# Patient Record
(demographics unavailable — no encounter records)

---
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
## 2024-11-06 NOTE — ASSESSMENT
[FreeTextEntry1] : . #Chest pain: Chest pain radiating to the right arm associated with dyspnea along with EKG changes is concerning for an MI.  I am sending the patient to the emergency room via ambulance to rule out myocardial infarction.

## 2024-11-06 NOTE — PHYSICAL EXAM
[Normal] : normal rate, regular rhythm, normal S1 and S2 and no murmur heard [TextEntry] : EKG shows normal sinus rhythm with a new inverted T wave in lead V1 and poor R wave progression to V2 which is new compared to previous EKGs.  There is also an interpolated VPC.

## 2024-11-11 NOTE — ASSESSMENT
[FreeTextEntry1] : 11.8.2022 marketing for optimum single straight male patient previously seen by Dr Abel Peña maintained on Testopel  6 pellets x 4 year treatment was for documented low T low mental acuity and fatigue when on T improved   had attempted xyosted had attempted 3 injection and then developed ecchymosis   discussed  We had an extensive discussion re Risks of T replacement; Patient was informed about Black box warning from FDA. We discussed recent data regarding increased risk of coronary plaque size, heart disease; thrombolic event; increased Hbg/Hct, breast tenderness; acne; irritability; sleep apnea and increased urinary symptoms; effect on testicular function including suppression of spermatogenesis; hair loss (male pattern baldness) effect on LFTS; effect on prostate cancer. Different treatment approaches were discussed including IM, transdermal and Testopel and AVEED We discussed advantages and disadvantages of each  PSA 6.03 on finasteride x 5 years; one year ago reported as 3 discussed need for MRI + family history - father prostate cancer  we discussed biopsy in light of strong family history and PSA elevation even in the absence of MRI findings discussed MRI for identification of areas of concern We discussed the risks and complications of prostate biopsy and discussed the procedure. We discussed that procedure is performed by placing a rectal probe and administering anesthesia locally. Biopsies are then taken with a needle. Multiple biopsies are taken,  Risks of the procedure include hematuria, hematospermia, blood per rectum. Risks include infection, sepsis and even death. We discussed perineal vs transrectal biopsy We discussed decreased risk of perineal biopsy vs transrectal but associated greater discomfort  We discussed options of: 1. local, 2. IV sedation in Operating room 3. po Valium  Plan: 1. repeat PSA 2. MRI arranged by Dr Mendez 3. obtain outside labs verifying low T (patient will forward) 4. DELAY T treatment pending evaluation of PSA  2.22.23 returns to proceed w/ TRT PSA 4.54 in November 2022 prostate MRI: 1/25/23--PIRADS 2, 99c prostate PSAD 0.045 discussed family history of prostate cancer paternal uncle and father s/p biopsy x 2 10 years ago  long discussion regarding PSA and family risk recommend repeat  bopsy patient wants AVEED since believe effect of xyosted is less effective discussed mean of delivery  Plan: patient agrees to : testosterone PSA urine culture We discussed the risks and complications of prostate biopsy and discussed the procedure. We discussed that procedure is performed by placing a rectal probe and administering anesthesia locally. Biopsies are then taken with a needle. Multiple biopsies are taken,  Risks of the procedure include hematuria, hematospermia, blood per rectum. Risks include infection, sepsis and even death. We discussed perineal vs transrectal biopsy We discussed decreased risk of perineal biopsy vs transrectal but associated greater discomfort  We discussed options of: 1. local, 2. IV sedation in Operating room 3. po Valium Fleets enema should be taken. ASA and NSAID must be stopped 1 week prior to procedure.  5.2.2023 PSA 4.54 in November 2022 prostate MRI: 1/25/23--PIRADS 2, 99c prostate PSAD 0.045 discussed family history of prostate cancer paternal uncle and father s/p biopsy x 2 10 years ago  REPEAT BIOPSY benign  patient states that he continues to xyosted  patient does not want to continue xyosted want to transition to aveed reviewed risks of AVEED with special emphasis on POME discussed  treatment protocol (initiation and maintenance) Informational materials given to patient.  6.20.2023  We had an extensive discussion re Risks of T replacement; Patient was informed about Black box warning from FDA. We discussed recent data regarding increased risk of coronary plaque size, heart disease; thrombolic event; increased Hbg/Hct, breast tenderness; acne; irritability; sleep apnea and increased urinary symptoms; effect on testicular function including suppression of spermatogenesis; hair loss (male pattern baldness) effect on LFTS; effect on prostate cancer. Different treatment approaches were discussed including IM, transdermal and Testopel We discussed advantages and disadvantages of each We discussed risk to T exposure to partners and children from transdermal appoaches. Patient choses to proceed with AVEED   9.28.2023 left flank pain ? musculoskeletal no radiation or change in urrinary symptoms findings suggestive of musculoskeletal origin consistent with prior diagnosis  patient anxious regarding symptoms  PLan: 1. urinalysis  2. abdominal ultrasound  3. NSAID/hot baths prn [facility]  expressed fully understanding of the information provided, the consequences and the management.  2/2/2024  Mr RANDALL returns.  He is complaining of similar urinary symptoms as in the past.  He states his urine is malodorous.  His urine frequency and nocturia have improved on alfuzosin.   We discussed his PSA.  We discussed his previous PSA values and his previous MRI and prostate biopsy.  Plan: 1.  PSA 4K 2.  We discussed the significance of a 4K assessment. 3.  Urinalysis urine culture 4.  Follow-up in 6 weeks for flow and PVR  3.21.2024 returns on daily tadalfil not sexually active LUTS 13 nocturia x 2 flow non diagnostic PVR 33 on uroxatral   will hold tadalfil since not sexually   PSA  5.0 4k low riske  Plan: continue AVEED Hbg/Hct 2/2024 normal stop daily tadalafil continue alfuzosin fu 3  weeks for AVEED    6.18.2024 complaining of nocturia x 4 on alfuzosin reviewed prior PVR nondiagnostic flow discussed need for flow to assess ? obstruction or voiding dysfunction patient queried about [place]? discussed potential for minimally invasive treatment patient not interested in intervention Plan: follow-up  flow and pvr

## 2024-11-11 NOTE — HISTORY OF PRESENT ILLNESS
[FreeTextEntry1] : 60 year old male referred by Dr. Mendez low testosterone for several years tried several replacement options prefers testopel but d/c'd use due to lack of insurance coverage currently uses injections   IPSS 17 LIV 15  11.8.2022 marketing for optimum single straight male patient previously seen by Dr Abel Peña maintained on Testopel  6 pellets x 4 year treatment was for documented low T low mental acuity and fatigue when on T improved    4.20.2023 s/p perineal biopsy complaining of urinary difficulty low grade temp last evening  4.26.2023 s/p perineal biopsy  5.2.2023 s/p biopsy   6.20.2023 returns re hypogonadism s/p biopsy for elevated PSA  7.20.2023 returns complaining of incomplete voiding  9.28.2023 patient presents complaining of left fank pain no fevers no chills no precitipating factors no radiation no dysuria or hematura  2.2.2024 complaining of urgency no dysuria no hematuria ?  malodorous urine on aluzosin IPSS 17 improved nocturia (1/2 what had been) nsns 3.21.2024 returns re LUTS returns re Hypogonadism returns re PSA seen in ER for chest pain and palipatios...EKG demonstrated ?abormality underwent angio  findngs reviewed - normal study   6.18.2024 returns re hypogonadism and LUTS maintained on aveed complainiing of nocturia and frequency nocturia x 4

## 2024-12-16 NOTE — HISTORY OF PRESENT ILLNESS
[FreeTextEntry8] : Beginning Saturday patient had the onset of periumbilical pain.  He had been constipated and over the weekend the pain got worse.  Last night he heard a lot of bowel sounds coming from his abdomen which caused him concern.  He had a very small bowel movement this morning despite using MiraLAX last night.  He tried an enema but just liquids came out.  He has had no fever but felt chilled.  There was no rigors.  He also tried bisacodyl suppository. On December 5 he underwent a cardiac catheterization with coronary artery spasm was noted.  His antihypertensive medications were discontinued and amlodipine 5 mg was started in its place. Last colonoscopy was in April according to the patient.

## 2024-12-16 NOTE — PHYSICAL EXAM
[Soft] : abdomen soft [Non Tender] : non-tender [Non-distended] : non-distended [de-identified] : There is no rebound in any quadrant.  There is mild to moderate tenderness in the left upper quadrant.

## 2024-12-16 NOTE — ASSESSMENT
[FreeTextEntry1] : . #Acute abd pain: Most likely related to constipation. Diverticulitis is less likely. Advise: Start Senna in addition to MiraLAX and Dulcolax. Try fleets enema as well. Patient is instructed to go to the ER if symptoms worsen. Patient feels that the amlodipine has caused the constipation but I don't think so. I asked him to discuss with his cardiologist.  #Hypertension: Blood pressure is elevated today but patient is in some discomfort.  Will continue amlodipine 5 mg for now pending input from the cardiologist.

## 2025-01-15 NOTE — HISTORY OF PRESENT ILLNESS
[FreeTextEntry8] : Patient is here because of abdominal discomfort and diarrhea.  Patient states he has a history of C. difficile colitis.  2 weeks ago he developed influenza and a sinus infection.  He took Tamiflu and a cephalosporin for sinus infection.  For the last few days he has had abdominal discomfort and a couple of loose bowel movements.  He has no fever or severe abdominal pain at this time he has had no hematochezia.

## 2025-01-15 NOTE — PHYSICAL EXAM
[Clear to Auscultation] : lungs were clear to auscultation bilaterally [Normal] : no respiratory distress, lungs were clear to auscultation bilaterally and no accessory muscle use [Soft] : abdomen soft [de-identified] : Mild left lower quadrant tenderness [TextEntry] : Repeat blood pressure manually is 150/90

## 2025-01-15 NOTE — ASSESSMENT
[FreeTextEntry1] : . #Abdominal pain and loose stool since taking an antibiotic: Must rule out C. difficile.  Will obtain stool for C. difficile  #Hypertension not controlled: Will reintroduce losartan 25 mg daily to patient's regimen and see him again in 2 weeks.

## 2025-01-18 NOTE — PHYSICAL EXAM
[TextEntry] : Gen: NAD Eyes: normal externally, no discharge, EOMI HENT: normal external ears, nose, mouth, MMM Neck: moving normally without stiffness Pulm: breathing normally without distress Skin: no obvious rashes Neuro: A&Ox3, no focal deficits Psych: Normal affect, cooperative

## 2025-01-18 NOTE — PLAN
[FreeTextEntry1] : 1. take fidaxomycin (Dificid) Twice a day for 10 days.  2.  Call your primary doctor first thing on Monday/Tuesday to make an appointment to follow-up with them.  3.  If you start having severe pain that is worsening, persistent blood in your stool, inability to eat or drink, persistently high fever, or any other major concern, please do not wait and go to the closest ER for immediate evaluation.

## 2025-01-18 NOTE — ASSESSMENT
[FreeTextEntry1] : While patient's evaluation is limited due to telemedicine aspect of visit, and I am unable to formally do an abdominal exam, he does not appear to be in any severe distress and does not appear to require emergent trip to the ER.  I reviewed his labs and his see if GDH toxin was indeed positive, with a negative PCR.  This likely points in a false positive result of GDH given it is the more sensitive and less specific test than the PCR, however he does exhibit some symptoms that would raise concern for C. difficile particular in the setting of recent antibiotic use.  Shared decision making with patient about different treatment options.  We agreed that I will start him on Dificid (fidaxomycin) twice daily x 10 days in case it is C. difficile, but I encouraged them to follow-up with his PCP first thing on Monday (or Tuesday due to holiday) and to follow their instructions and what to do next and how to proceed.  Patient knows that if his symptoms worsen over the next few days prior to him being able to his PCP, particularly if his pain in his left lower quadrant worsens, if he starts having blood in the stool, if he has persistent vomiting, or any other major symptoms that he needs to be evaluated in the ED as other alternative etiology such as diverticulitis, other colitis, perforation, would be more relevant and would need immediate evaluation.

## 2025-01-18 NOTE — HISTORY OF PRESENT ILLNESS
[Home] : at home, [unfilled] , at the time of the visit. [Other Location: e.g. Home (Enter Location, City,State)___] : at [unfilled] [Verbal consent obtained from patient] : the patient, [unfilled] [FreeTextEntry8] : Patient presents with concern for C. difficile colitis.  Patient was on antibiotics for sinus infection a week ago, and several days ago developed nausea, vomiting, abdominal pain, diarrhea.  He saw his doctor on Thursday and due to several bouts of diarrhea every day, and a remote history of C. difficile infection several years ago, his doctor recommended they tested him for C. difficile.  Today he called back because his results came back, one of them positive.  In reviewing his test results, his C. difficile GDH toxin was positive, however the reflex PCR was negative.  Patient states he has had persistent diarrhea maybe 3-4 bouts per day and intermittent left lower quadrant pain for the last few days.  He denies any severe vomiting or fever, still eating and drinking normally, pain is bearable.  He does endorse a little bit of blood in his stool earlier today.

## 2025-01-22 NOTE — ASSESSMENT
[FreeTextEntry1] : . #Hypertension: Not controlled.  Will increase losartan to 50 mg daily and see patient back here next week  #Abdominal pain diarrhea: I doubt the patient has C. difficile colitis because of the negative stool studies.  However, he has a remote history of C. difficile colitis and was recently on antibiotic so we will continue vancomycin for now.  Will also refer to GI for possible lower endoscopy because of hematochezia and history of tenesmus.  Patient has a gastroenterologist and he will call him tomorrow.

## 2025-01-22 NOTE — PHYSICAL EXAM
[TextEntry] : Constitutional: Patient is well nourished, well appearing and in no distress Pulmonary: No respiratory distress Abdomen: Soft and minimally tender in the left side.  Patient describes the tenderness towards the left flank.  There is no costovertebral angle tenderness Neuro: Speech normal, alert and oriented Psychiatric: Normal mood, normal insight/judgement, normal affect

## 2025-01-22 NOTE — HISTORY OF PRESENT ILLNESS
[FreeTextEntry1] : Patient is here for follow-up of blood pressure and abdominal pain [de-identified] : Patient was in the office yesterday for his Apretude injection and his blood pressure was noted to be elevated at that time.  He has not been able to check his blood pressure at home because of a faulty sphygmomanometer.  Patient had been on losartan 100 mg combined with hydrochlorothiazide 12.5 mg once a day but this was discontinued after his cardiac cath because of low blood pressure on ranolazine.  Losartan 25 mg was added because of elevated blood pressure. Last week, the patient came to the office because of abdominal pain and diarrhea having just completed a course of antibiotics for sinusitis.  He has a remote history of C. difficile colitis and stool was obtained the next day for C. difficile.  The GDH came back positive but toxin and toxin gene were negative.  Patient called the service and was connected to a televisit with the physician who prescribed Dificid.  However the pharmacy could not obtain Dificid so I prescribed vancomycin.  The patient's diarrhea has resolved but he still has significant abdominal pain.  When he had diarrhea it was associated with mild hematochezia.  Patient states his abdominal pain is in the left lower quadrant and left flank.  He states after a bowel movement the area throbs.  He also notes episodes of tenesmus.

## 2025-01-29 NOTE — PHYSICAL EXAM
[TextEntry] : Constitutional: Patient is well nourished, well appearing and in no distress Pulmonary: No respiratory distress Neuro: Speech normal, alert and oriented Psychiatric: Normal mood, normal insight/judgement, normal affect  Manual blood pressure reading is 158/80

## 2025-01-29 NOTE — HISTORY OF PRESENT ILLNESS
[FreeTextEntry1] : Patient is here for follow-up of hypertension [de-identified] : Patient states his blood pressures at home have been high.  He is currently on losartan 50 mg daily.  He also takes ranolazine for coronary artery spasm. Patient continues to have GI issues and is in communication with his gastroenterologist who may perform a colonoscopy.

## 2025-02-03 NOTE — HISTORY OF PRESENT ILLNESS
[FreeTextEntry1] : 60 year old male referred by Dr. Mendez low testosterone for several years tried several replacement options prefers testopel but d/c'd use due to lack of insurance coverage currently uses injections   IPSS 17 LIV 15  11.8.2022 marketing for optimum single straight male patient previously seen by Dr Abel Peña maintained on Testopel  6 pellets x 4 year treatment was for documented low T low mental acuity and fatigue when on T improved    4.20.2023 s/p perineal biopsy complaining of urinary difficulty low grade temp last evening  4.26.2023 s/p perineal biopsy  5.2.2023 s/p biopsy   6.20.2023 returns re hypogonadism s/p biopsy for elevated PSA  7.20.2023 returns complaining of incomplete voiding  9.28.2023 patient presents complaining of left fank pain no fevers no chills no precitipating factors no radiation no dysuria or hematura  2.2.2024 complaining of urgency no dysuria no hematuria ?  malodorous urine on aluzosin IPSS 17 improved nocturia (1/2 what had been) nsns 3.21.2024 returns re LUTS returns re Hypogonadism returns re PSA seen in ER for chest pain and palipations...EKG demonstrated ?abormality underwent angio  findngs reviewed - normal study   6.18.2024 returns re hypogonadism and LUTS maintained on aveed complainiing of nocturia and frequency nocturia x 4  2.3.2025 patient interested in 4K and PSa complaining of ED on tadalafil not responding wants to discuss IPP

## 2025-02-03 NOTE — ASSESSMENT
[FreeTextEntry1] : 11.8.2022 marketing for optimum single straight male patient previously seen by Dr Abel Peña maintained on Testopel  6 pellets x 4 year treatment was for documented low T low mental acuity and fatigue when on T improved   had attempted xyosted had attempted 3 injection and then developed ecchymosis   discussed  We had an extensive discussion re Risks of T replacement; Patient was informed about Black box warning from FDA. We discussed recent data regarding increased risk of coronary plaque size, heart disease; thrombolic event; increased Hbg/Hct, breast tenderness; acne; irritability; sleep apnea and increased urinary symptoms; effect on testicular function including suppression of spermatogenesis; hair loss (male pattern baldness) effect on LFTS; effect on prostate cancer. Different treatment approaches were discussed including IM, transdermal and Testopel and AVEED We discussed advantages and disadvantages of each  PSA 6.03 on finasteride x 5 years; one year ago reported as 3 discussed need for MRI + family history - father prostate cancer  we discussed biopsy in light of strong family history and PSA elevation even in the absence of MRI findings discussed MRI for identification of areas of concern We discussed the risks and complications of prostate biopsy and discussed the procedure. We discussed that procedure is performed by placing a rectal probe and administering anesthesia locally. Biopsies are then taken with a needle. Multiple biopsies are taken,  Risks of the procedure include hematuria, hematospermia, blood per rectum. Risks include infection, sepsis and even death. We discussed perineal vs transrectal biopsy We discussed decreased risk of perineal biopsy vs transrectal but associated greater discomfort  We discussed options of: 1. local, 2. IV sedation in Operating room 3. po Valium  Plan: 1. repeat PSA 2. MRI arranged by Dr Mendez 3. obtain outside labs verifying low T (patient will forward) 4. DELAY T treatment pending evaluation of PSA  2.22.23 returns to proceed w/ TRT PSA 4.54 in November 2022 prostate MRI: 1/25/23--PIRADS 2, 99c prostate PSAD 0.045 discussed family history of prostate cancer paternal uncle and father s/p biopsy x 2 10 years ago  long discussion regarding PSA and family risk recommend repeat  bopsy patient wants AVEED since believe effect of xyosted is less effective discussed mean of delivery  Plan: patient agrees to : testosterone PSA urine culture We discussed the risks and complications of prostate biopsy and discussed the procedure. We discussed that procedure is performed by placing a rectal probe and administering anesthesia locally. Biopsies are then taken with a needle. Multiple biopsies are taken,  Risks of the procedure include hematuria, hematospermia, blood per rectum. Risks include infection, sepsis and even death. We discussed perineal vs transrectal biopsy We discussed decreased risk of perineal biopsy vs transrectal but associated greater discomfort  We discussed options of: 1. local, 2. IV sedation in Operating room 3. po Valium Fleets enema should be taken. ASA and NSAID must be stopped 1 week prior to procedure.  5.2.2023 PSA 4.54 in November 2022 prostate MRI: 1/25/23--PIRADS 2, 99c prostate PSAD 0.045 discussed family history of prostate cancer paternal uncle and father s/p biopsy x 2 10 years ago  REPEAT BIOPSY benign  patient states that he continues to xyosted  patient does not want to continue xyosted want to transition to aveed reviewed risks of AVEED with special emphasis on POME discussed  treatment protocol (initiation and maintenance) Informational materials given to patient.  6.20.2023  We had an extensive discussion re Risks of T replacement; Patient was informed about Black box warning from FDA. We discussed recent data regarding increased risk of coronary plaque size, heart disease; thrombolic event; increased Hbg/Hct, breast tenderness; acne; irritability; sleep apnea and increased urinary symptoms; effect on testicular function including suppression of spermatogenesis; hair loss (male pattern baldness) effect on LFTS; effect on prostate cancer. Different treatment approaches were discussed including IM, transdermal and Testopel We discussed advantages and disadvantages of each We discussed risk to T exposure to partners and children from transdermal appoaches. Patient choses to proceed with AVEED   9.28.2023 left flank pain ? musculoskeletal no radiation or change in urrinary symptoms findings suggestive of musculoskeletal origin consistent with prior diagnosis  patient anxious regarding symptoms  PLan: 1. urinalysis  2. abdominal ultrasound  3. NSAID/hot baths prn [facility]  expressed fully understanding of the information provided, the consequences and the management.  2/2/2024  Mr RANDALL returns.  He is complaining of similar urinary symptoms as in the past.  He states his urine is malodorous.  His urine frequency and nocturia have improved on alfuzosin.   We discussed his PSA.  We discussed his previous PSA values and his previous MRI and prostate biopsy.  Plan: 1.  PSA 4K 2.  We discussed the significance of a 4K assessment. 3.  Urinalysis urine culture 4.  Follow-up in 6 weeks for flow and PVR  3.21.2024 returns on daily tadalfil not sexually active LUTS 13 nocturia x 2 flow non diagnostic PVR 33 on uroxatral   will hold tadalfil since not sexually   PSA  5.0 4k low risk  Plan: continue AVEED Hbg/Hct 2/2024 normal stop daily tadalafil continue alfuzosin fu 3  weeks for AVEED   2.3.2024  DIscussed  treatment options including PD 5-I, Intracavernosal therapy, erection vacuum device. Instructional materials provided to patient. Risks and side effects of each discussed. We also had extensive discussion about penile prosthetic surgery. We discussed the risks and complications of penile prosthetic surgery including; 1. infection, 2. Mechanical failure, 3. Erosion, 4. Urethral injury,   Discussed dose escalation of tadalafil 20 mg Warned re priapism risk and need for immediate intervention if erection lasts more than 2 hours.  Patient instructed to report to an emergency room and explicitly inform staff of his condition and need for treatment.  Informational materials given to patient.  Plan: tadalafil 20 mg

## 2025-02-12 NOTE — HISTORY OF PRESENT ILLNESS
[FreeTextEntry1] : This is a follow-up visit for hypertension.  Patient recently contracted COVID and it was converted to a televisit with audio and video components to which patient agrees. [de-identified] : Patient states his blood pressure is much improved on hydrochlorothiazide 12.5 mg daily and losartan 100 mg daily.  The readings he is getting at home is on average 140/80. This past Sunday he developed sore throat, cough, fever, chills, myalgias and he went to urgent care.  He tested positive for COVID and they offered him Paxlovid.  However, his cardiologist told him he cannot take Paxlovid because of interactions with important medications.  Patient states he still not feeling well has myalgias and low-grade temperature and would like to take medication for COVID if possible.  He is taking Tylenol and using Flonase for nasal congestion but still not feeling great.

## 2025-02-12 NOTE — PHYSICAL EXAM
[TextEntry] : Constitutional: Patient is well nourished, well appearing and in no distress.  Patient does not look toxic Pulmonary: No respiratory distress Neuro: Speech normal, alert and oriented Psychiatric: Normal mood, normal insight/judgement, normal affect

## 2025-03-12 NOTE — HISTORY OF PRESENT ILLNESS
[FreeTextEntry1] : Patient is here for follow-up of hypertension, Prep, recent bout of COVID and is complaining of chest pain. [de-identified] : Patient feels like he is not fully recovered from COVID.  The respiratory symptoms and fever have resolved but he still feels fatigued. Patient also feels a persistent chest discomfort which is intermittently worse and associated with palpitations.  He discussed this with his cardiologist who told him that he is just having anxiety.  However he states he does not feel anxious.  The cardiologist performed an EKG in his office but I cannot access the record. Patient states his blood pressures at home are in the 135/80 range No issues with injections for PreP

## 2025-04-08 NOTE — PHYSICAL EXAM
[Prostate Tenderness] : the prostate was not tender [No Prostate Nodules] : no prostate nodules [Prostate Size ___ (0-4)] : prostate size [unfilled] (scale: 0-4) [de-identified] : flow nondiagnostic; PVR 55 [Chaperone Present] : A chaperone was present in the examining room during all aspects of the physical examination [FreeTextEntry2] : Tara Rees MA

## 2025-04-08 NOTE — ASSESSMENT
[FreeTextEntry1] : 11.8.2022 marketing for optimum single straight male patient previously seen by Dr Abel Peña maintained on Testopel  6 pellets x 4 year treatment was for documented low T low mental acuity and fatigue when on T improved   had attempted xyosted had attempted 3 injection and then developed ecchymosis   discussed  We had an extensive discussion re Risks of T replacement; Patient was informed about Black box warning from FDA. We discussed recent data regarding increased risk of coronary plaque size, heart disease; thrombolic event; increased Hbg/Hct, breast tenderness; acne; irritability; sleep apnea and increased urinary symptoms; effect on testicular function including suppression of spermatogenesis; hair loss (male pattern baldness) effect on LFTS; effect on prostate cancer. Different treatment approaches were discussed including IM, transdermal and Testopel and AVEED We discussed advantages and disadvantages of each  PSA 6.03 on finasteride x 5 years; one year ago reported as 3 discussed need for MRI + family history - father prostate cancer  we discussed biopsy in light of strong family history and PSA elevation even in the absence of MRI findings discussed MRI for identification of areas of concern We discussed the risks and complications of prostate biopsy and discussed the procedure. We discussed that procedure is performed by placing a rectal probe and administering anesthesia locally. Biopsies are then taken with a needle. Multiple biopsies are taken,  Risks of the procedure include hematuria, hematospermia, blood per rectum. Risks include infection, sepsis and even death. We discussed perineal vs transrectal biopsy We discussed decreased risk of perineal biopsy vs transrectal but associated greater discomfort  We discussed options of: 1. local, 2. IV sedation in Operating room 3. po Valium  Plan: 1. repeat PSA 2. MRI arranged by Dr Mendez 3. obtain outside labs verifying low T (patient will forward) 4. DELAY T treatment pending evaluation of PSA  2.22.23 returns to proceed w/ TRT PSA 4.54 in November 2022 prostate MRI: 1/25/23--PIRADS 2, 99c prostate PSAD 0.045 discussed family history of prostate cancer paternal uncle and father s/p biopsy x 2 10 years ago  long discussion regarding PSA and family risk recommend repeat  bopsy patient wants AVEED since believe effect of xyosted is less effective discussed mean of delivery  Plan: patient agrees to : testosterone PSA urine culture We discussed the risks and complications of prostate biopsy and discussed the procedure. We discussed that procedure is performed by placing a rectal probe and administering anesthesia locally. Biopsies are then taken with a needle. Multiple biopsies are taken,  Risks of the procedure include hematuria, hematospermia, blood per rectum. Risks include infection, sepsis and even death. We discussed perineal vs transrectal biopsy We discussed decreased risk of perineal biopsy vs transrectal but associated greater discomfort  We discussed options of: 1. local, 2. IV sedation in Operating room 3. po Valium Fleets enema should be taken. ASA and NSAID must be stopped 1 week prior to procedure.  5.2.2023 PSA 4.54 in November 2022 prostate MRI: 1/25/23--PIRADS 2, 99c prostate PSAD 0.045 discussed family history of prostate cancer paternal uncle and father s/p biopsy x 2 10 years ago  REPEAT BIOPSY benign  patient states that he continues to xyosted  patient does not want to continue xyosted want to transition to aveed reviewed risks of AVEED with special emphasis on POME discussed  treatment protocol (initiation and maintenance) Informational materials given to patient.  6.20.2023  We had an extensive discussion re Risks of T replacement; Patient was informed about Black box warning from FDA. We discussed recent data regarding increased risk of coronary plaque size, heart disease; thrombolic event; increased Hbg/Hct, breast tenderness; acne; irritability; sleep apnea and increased urinary symptoms; effect on testicular function including suppression of spermatogenesis; hair loss (male pattern baldness) effect on LFTS; effect on prostate cancer. Different treatment approaches were discussed including IM, transdermal and Testopel We discussed advantages and disadvantages of each We discussed risk to T exposure to partners and children from transdermal appoaches. Patient choses to proceed with AVEED   9.28.2023 left flank pain ? musculoskeletal no radiation or change in urrinary symptoms findings suggestive of musculoskeletal origin consistent with prior diagnosis  patient anxious regarding symptoms  PLan: 1. urinalysis  2. abdominal ultrasound  3. NSAID/hot baths prn [facility]  expressed fully understanding of the information provided, the consequences and the management.  2/2/2024  Mr RANDALL returns.  He is complaining of similar urinary symptoms as in the past.  He states his urine is malodorous.  His urine frequency and nocturia have improved on alfuzosin.   We discussed his PSA.  We discussed his previous PSA values and his previous MRI and prostate biopsy.  Plan: 1.  PSA 4K 2.  We discussed the significance of a 4K assessment. 3.  Urinalysis urine culture 4.  Follow-up in 6 weeks for flow and PVR  3.21.2024 returns on daily tadalfil not sexually active LUTS 13 nocturia x 2 flow non diagnostic PVR 33 on uroxatral   will hold tadalfil since not sexually   PSA  5.0 4k low risk  Plan: continue AVEED Hbg/Hct 2/2024 normal stop daily tadalafil continue alfuzosin fu 3  weeks for AVEED   2.3.2025  DIscussed  treatment options including PD 5-I, Intracavernosal therapy, erection vacuum device. Instructional materials provided to patient. Risks and side effects of each discussed. We also had extensive discussion about penile prosthetic surgery. We discussed the risks and complications of penile prosthetic surgery including; 1. infection, 2. Mechanical failure, 3. Erosion, 4. Urethral injury,   Discussed dose escalation of tadalafil 20 mg Warned re priapism risk and need for immediate intervention if erection lasts more than 2 hours.  Patient instructed to report to an emergency room and explicitly inform staff of his condition and need for treatment.  Informational materials given to patient.  Plan: tadalafil 20 mg  4.8.2025 complaining of post void dribbling with increased nocturia no dysuria  no change in stream flow rate nondiagnostic continues on uroxatral   no change in bowel habits  MRI demomstrated prostate volume 98.1   Plan: urinalysis urine culture follow flow and PVR +/- cystoscopy

## 2025-04-08 NOTE — HISTORY OF PRESENT ILLNESS
[FreeTextEntry1] : 60 year old male referred by Dr. Mendez low testosterone for several years tried several replacement options prefers testopel but d/c'd use due to lack of insurance coverage currently uses injections   IPSS 17 LIV 15  11.8.2022 marketing for optimum single straight male patient previously seen by Dr Abel Peña maintained on Testopel  6 pellets x 4 year treatment was for documented low T low mental acuity and fatigue when on T improved    4.20.2023 s/p perineal biopsy complaining of urinary difficulty low grade temp last evening  4.26.2023 s/p perineal biopsy  5.2.2023 s/p biopsy   6.20.2023 returns re hypogonadism s/p biopsy for elevated PSA  7.20.2023 returns complaining of incomplete voiding  9.28.2023 patient presents complaining of left fank pain no fevers no chills no precitipating factors no radiation no dysuria or hematura  2.2.2024 complaining of urgency no dysuria no hematuria ?  malodorous urine on aluzosin IPSS 17 improved nocturia (1/2 what had been) nsns 3.21.2024 returns re LUTS returns re Hypogonadism returns re PSA seen in ER for chest pain and palipations...EKG demonstrated ?abormality underwent angio  findngs reviewed - normal study   6.18.2024 returns re hypogonadism and LUTS maintained on aveed complainiing of nocturia and frequency nocturia x 4  2.3.2025 patient interested in 4K and PSa complaining of ED on tadalafil not responding wants to discuss IPP  4.8.2028 returns re ? postvoid dribbiling no dyuria  no hematuria stream "average" nocturia x 3

## 2025-04-08 NOTE — PHYSICAL EXAM
[Prostate Tenderness] : the prostate was not tender [No Prostate Nodules] : no prostate nodules [Prostate Size ___ (0-4)] : prostate size [unfilled] (scale: 0-4) [de-identified] : flow nondiagnostic; PVR 55 [Chaperone Present] : A chaperone was present in the examining room during all aspects of the physical examination [FreeTextEntry2] : Tara Rees MA

## 2025-04-14 NOTE — ASSESSMENT
[FreeTextEntry1] : 11.8.2022 marketing for optimum single straight male patient previously seen by Dr Abel Peña maintained on Testopel  6 pellets x 4 year treatment was for documented low T low mental acuity and fatigue when on T improved   had attempted xyosted had attempted 3 injection and then developed ecchymosis   discussed  We had an extensive discussion re Risks of T replacement; Patient was informed about Black box warning from FDA. We discussed recent data regarding increased risk of coronary plaque size, heart disease; thrombolic event; increased Hbg/Hct, breast tenderness; acne; irritability; sleep apnea and increased urinary symptoms; effect on testicular function including suppression of spermatogenesis; hair loss (male pattern baldness) effect on LFTS; effect on prostate cancer. Different treatment approaches were discussed including IM, transdermal and Testopel and AVEED We discussed advantages and disadvantages of each  PSA 6.03 on finasteride x 5 years; one year ago reported as 3 discussed need for MRI + family history - father prostate cancer  we discussed biopsy in light of strong family history and PSA elevation even in the absence of MRI findings discussed MRI for identification of areas of concern We discussed the risks and complications of prostate biopsy and discussed the procedure. We discussed that procedure is performed by placing a rectal probe and administering anesthesia locally. Biopsies are then taken with a needle. Multiple biopsies are taken,  Risks of the procedure include hematuria, hematospermia, blood per rectum. Risks include infection, sepsis and even death. We discussed perineal vs transrectal biopsy We discussed decreased risk of perineal biopsy vs transrectal but associated greater discomfort  We discussed options of: 1. local, 2. IV sedation in Operating room 3. po Valium  Plan: 1. repeat PSA 2. MRI arranged by Dr Mendez 3. obtain outside labs verifying low T (patient will forward) 4. DELAY T treatment pending evaluation of PSA  2.22.23 returns to proceed w/ TRT PSA 4.54 in November 2022 prostate MRI: 1/25/23--PIRADS 2, 99c prostate PSAD 0.045 discussed family history of prostate cancer paternal uncle and father s/p biopsy x 2 10 years ago  long discussion regarding PSA and family risk recommend repeat  bopsy patient wants AVEED since believe effect of xyosted is less effective discussed mean of delivery  Plan: patient agrees to : testosterone PSA urine culture We discussed the risks and complications of prostate biopsy and discussed the procedure. We discussed that procedure is performed by placing a rectal probe and administering anesthesia locally. Biopsies are then taken with a needle. Multiple biopsies are taken,  Risks of the procedure include hematuria, hematospermia, blood per rectum. Risks include infection, sepsis and even death. We discussed perineal vs transrectal biopsy We discussed decreased risk of perineal biopsy vs transrectal but associated greater discomfort  We discussed options of: 1. local, 2. IV sedation in Operating room 3. po Valium Fleets enema should be taken. ASA and NSAID must be stopped 1 week prior to procedure.  5.2.2023 PSA 4.54 in November 2022 prostate MRI: 1/25/23--PIRADS 2, 99c prostate PSAD 0.045 discussed family history of prostate cancer paternal uncle and father s/p biopsy x 2 10 years ago  REPEAT BIOPSY benign  patient states that he continues to xyosted  patient does not want to continue xyosted want to transition to aveed reviewed risks of AVEED with special emphasis on POME discussed  treatment protocol (initiation and maintenance) Informational materials given to patient.  6.20.2023  We had an extensive discussion re Risks of T replacement; Patient was informed about Black box warning from FDA. We discussed recent data regarding increased risk of coronary plaque size, heart disease; thrombolic event; increased Hbg/Hct, breast tenderness; acne; irritability; sleep apnea and increased urinary symptoms; effect on testicular function including suppression of spermatogenesis; hair loss (male pattern baldness) effect on LFTS; effect on prostate cancer. Different treatment approaches were discussed including IM, transdermal and Testopel We discussed advantages and disadvantages of each We discussed risk to T exposure to partners and children from transdermal appoaches. Patient choses to proceed with AVEED   9.28.2023 left flank pain ? musculoskeletal no radiation or change in urrinary symptoms findings suggestive of musculoskeletal origin consistent with prior diagnosis  patient anxious regarding symptoms  PLan: 1. urinalysis  2. abdominal ultrasound  3. NSAID/hot baths prn [facility]  expressed fully understanding of the information provided, the consequences and the management.  2/2/2024  Mr RANDALL returns.  He is complaining of similar urinary symptoms as in the past.  He states his urine is malodorous.  His urine frequency and nocturia have improved on alfuzosin.   We discussed his PSA.  We discussed his previous PSA values and his previous MRI and prostate biopsy.  Plan: 1.  PSA 4K 2.  We discussed the significance of a 4K assessment. 3.  Urinalysis urine culture 4.  Follow-up in 6 weeks for flow and PVR  3.21.2024 returns on daily tadalfil not sexually active LUTS 13 nocturia x 2 flow non diagnostic PVR 33 on uroxatral   will hold tadalfil since not sexually   PSA  5.0 4k low risk  Plan: continue AVEED Hbg/Hct 2/2024 normal stop daily tadalafil continue alfuzosin fu 3  weeks for AVEED   2.3.2025  DIscussed  treatment options including PD 5-I, Intracavernosal therapy, erection vacuum device. Instructional materials provided to patient. Risks and side effects of each discussed. We also had extensive discussion about penile prosthetic surgery. We discussed the risks and complications of penile prosthetic surgery including; 1. infection, 2. Mechanical failure, 3. Erosion, 4. Urethral injury,   Discussed dose escalation of tadalafil 20 mg Warned re priapism risk and need for immediate intervention if erection lasts more than 2 hours.  Patient instructed to report to an emergency room and explicitly inform staff of his condition and need for treatment.  Informational materials given to patient.  Plan: tadalafil 20 mg  4.8.2025 complaining of post void dribbling with increased nocturia no dysuria  no change in stream flow rate nondiagnostic continues on uroxatral   no change in bowel habits  MRI demomstrated prostate volume 98.1   Plan: urinalysis urine culture follow flow and PVR +/- cystoscopy   4.14.2025 returns continues to complain of  post void dribbling flow 7.7 cc/sec; volume 76; PVR 19 cc discussed possibility of urethral stricture discussed +/cystoscopy to r/o stricture discussed nitrous oxide patient will consider

## 2025-04-14 NOTE — HISTORY OF PRESENT ILLNESS
[FreeTextEntry1] : 60 year old male referred by Dr. Mendez low testosterone for several years tried several replacement options prefers testopel but d/c'd use due to lack of insurance coverage currently uses injections   IPSS 17 LIV 15  11.8.2022 marketing for optimum single straight male patient previously seen by Dr Abel Peña maintained on Testopel  6 pellets x 4 year treatment was for documented low T low mental acuity and fatigue when on T improved    4.20.2023 s/p perineal biopsy complaining of urinary difficulty low grade temp last evening  4.26.2023 s/p perineal biopsy  5.2.2023 s/p biopsy   6.20.2023 returns re hypogonadism s/p biopsy for elevated PSA  7.20.2023 returns complaining of incomplete voiding  9.28.2023 patient presents complaining of left fank pain no fevers no chills no precitipating factors no radiation no dysuria or hematura  2.2.2024 complaining of urgency no dysuria no hematuria ?  malodorous urine on aluzosin IPSS 17 improved nocturia (1/2 what had been) nsns 3.21.2024 returns re LUTS returns re Hypogonadism returns re PSA seen in ER for chest pain and palipations...EKG demonstrated ?abormality underwent angio  findngs reviewed - normal study   6.18.2024 returns re hypogonadism and LUTS maintained on aveed complainiing of nocturia and frequency nocturia x 4  2.3.2025 patient interested in 4K and PSa complaining of ED on tadalafil not responding wants to discuss IPP  4.14.2025 complaining of dribbling post void

## 2025-06-23 NOTE — HISTORY OF PRESENT ILLNESS
[FreeTextEntry1] : 60 year old male referred by Dr. Mendez low testosterone for several years tried several replacement options prefers testopel but d/c'd use due to lack of insurance coverage currently uses injections   IPSS 17 LIV 15  11.8.2022 marketing for optimum single straight male patient previously seen by Dr Abel Peña maintained on Testopel  6 pellets x 4 year treatment was for documented low T low mental acuity and fatigue when on T improved    4.20.2023 s/p perineal biopsy complaining of urinary difficulty low grade temp last evening  4.26.2023 s/p perineal biopsy  5.2.2023 s/p biopsy   6.20.2023 returns re hypogonadism s/p biopsy for elevated PSA  7.20.2023 returns complaining of incomplete voiding  9.28.2023 patient presents complaining of left fank pain no fevers no chills no precitipating factors no radiation no dysuria or hematura  2.2.2024 complaining of urgency no dysuria no hematuria ?  malodorous urine on aluzosin IPSS 17 improved nocturia (1/2 what had been) nsns 3.21.2024 returns re LUTS returns re Hypogonadism returns re PSA seen in ER for chest pain and palipations...EKG demonstrated ?abormality underwent angio  findngs reviewed - normal study   6.18.2024 returns re hypogonadism and LUTS maintained on aveed complainiing of nocturia and frequency nocturia x 4  2.3.2025 patient interested in 4K and PSa complaining of ED on tadalafil not responding wants to discuss IPP  4.14.2025 complaining of dribbling post void   6.23.2025 s/p penuma procedure Dr Santo complicated by infection and [id number]

## 2025-06-23 NOTE — HISTORY OF PRESENT ILLNESS
[FreeTextEntry1] : 60 year old male referred by Dr. Mendez low testosterone for several years tried several replacement options prefers testopel but d/c'd use due to lack of insurance coverage currently uses injections   IPSS 17 LIV 15  11.8.2022 marketing for optimum single straight male patient previously seen by Dr Abel Peña maintained on Testopel  6 pellets x 4 year treatment was for documented low T low mental acuity and fatigue when on T improved    4.20.2023 s/p perineal biopsy complaining of urinary difficulty low grade temp last evening  4.26.2023 s/p perineal biopsy  5.2.2023 s/p biopsy   6.20.2023 returns re hypogonadism s/p biopsy for elevated PSA  7.20.2023 returns complaining of incomplete voiding  9.28.2023 patient presents complaining of left fank pain no fevers no chills no precitipating factors no radiation no dysuria or hematura  2.2.2024 complaining of urgency no dysuria no hematuria ?  malodorous urine on aluzosin IPSS 17 improved nocturia (1/2 what had been) nsns 3.21.2024 returns re LUTS returns re Hypogonadism returns re PSA seen in ER for chest pain and palipations...EKG demonstrated ?abormality underwent angio  findngs reviewed - normal study   6.18.2024 returns re hypogonadism and LUTS maintained on aveed complainiing of nocturia and frequency nocturia x 4  2.3.2025 patient interested in 4K and PSa complaining of ED on tadalafil not responding wants to discuss IPP  4.14.2025 complaining of dribbling post void   6.23.2025 s/p penuma procedure Dr Santo complicated by infection and [id number]

## 2025-06-23 NOTE — HISTORY OF PRESENT ILLNESS
[FreeTextEntry1] : 60 year old male referred by Dr. Mendez low testosterone for several years tried several replacement options prefers testopel but d/c'd use due to lack of insurance coverage currently uses injections   IPSS 17 LIV 15  11.8.2022 marketing for optimum single straight male patient previously seen by Dr Abel Peña maintained on Testopel  6 pellets x 4 year treatment was for documented low T low mental acuity and fatigue when on T improved    4.20.2023 s/p perineal biopsy complaining of urinary difficulty low grade temp last evening  4.26.2023 s/p perineal biopsy  5.2.2023 s/p biopsy   6.20.2023 returns re hypogonadism s/p biopsy for elevated PSA  7.20.2023 returns complaining of incomplete voiding  9.28.2023 patient presents complaining of left fank pain no fevers no chills no precitipating factors no radiation no dysuria or hematura  2.2.2024 complaining of urgency no dysuria no hematuria ?  malodorous urine on aluzosin IPSS 17 improved nocturia (1/2 what had been) nsns 3.21.2024 returns re LUTS returns re Hypogonadism returns re PSA seen in ER for chest pain and palipations...EKG demonstrated ?abormality underwent angio  findngs reviewed - normal study   6.18.2024 returns re hypogonadism and LUTS maintained on aveed complainiing of nocturia and frequency nocturia x 4  2.3.2025 patient interested in 4K and PSa complaining of ED on tadalafil not responding wants to discuss IPP  4.14.2025 complaining of dribbling post void   6.23.2025 s/p penuma procedure Dr Santo complicated by infection and [id number]

## 2025-06-23 NOTE — ASSESSMENT
[FreeTextEntry1] : 11.8.2022 marketing for optimum single straight male patient previously seen by Dr Abel Peña maintained on Testopel  6 pellets x 4 year treatment was for documented low T low mental acuity and fatigue when on T improved   had attempted xyosted had attempted 3 injection and then developed ecchymosis   discussed  We had an extensive discussion re Risks of T replacement; Patient was informed about Black box warning from FDA. We discussed recent data regarding increased risk of coronary plaque size, heart disease; thrombolic event; increased Hbg/Hct, breast tenderness; acne; irritability; sleep apnea and increased urinary symptoms; effect on testicular function including suppression of spermatogenesis; hair loss (male pattern baldness) effect on LFTS; effect on prostate cancer. Different treatment approaches were discussed including IM, transdermal and Testopel and AVEED We discussed advantages and disadvantages of each  PSA 6.03 on finasteride x 5 years; one year ago reported as 3 discussed need for MRI + family history - father prostate cancer  we discussed biopsy in light of strong family history and PSA elevation even in the absence of MRI findings discussed MRI for identification of areas of concern We discussed the risks and complications of prostate biopsy and discussed the procedure. We discussed that procedure is performed by placing a rectal probe and administering anesthesia locally. Biopsies are then taken with a needle. Multiple biopsies are taken,  Risks of the procedure include hematuria, hematospermia, blood per rectum. Risks include infection, sepsis and even death. We discussed perineal vs transrectal biopsy We discussed decreased risk of perineal biopsy vs transrectal but associated greater discomfort  We discussed options of: 1. local, 2. IV sedation in Operating room 3. po Valium  Plan: 1. repeat PSA 2. MRI arranged by Dr Mendez 3. obtain outside labs verifying low T (patient will forward) 4. DELAY T treatment pending evaluation of PSA  2.22.23 returns to proceed w/ TRT PSA 4.54 in November 2022 prostate MRI: 1/25/23--PIRADS 2, 99c prostate PSAD 0.045 discussed family history of prostate cancer paternal uncle and father s/p biopsy x 2 10 years ago  long discussion regarding PSA and family risk recommend repeat  bopsy patient wants AVEED since believe effect of xyosted is less effective discussed mean of delivery  Plan: patient agrees to : testosterone PSA urine culture We discussed the risks and complications of prostate biopsy and discussed the procedure. We discussed that procedure is performed by placing a rectal probe and administering anesthesia locally. Biopsies are then taken with a needle. Multiple biopsies are taken,  Risks of the procedure include hematuria, hematospermia, blood per rectum. Risks include infection, sepsis and even death. We discussed perineal vs transrectal biopsy We discussed decreased risk of perineal biopsy vs transrectal but associated greater discomfort  We discussed options of: 1. local, 2. IV sedation in Operating room 3. po Valium Fleets enema should be taken. ASA and NSAID must be stopped 1 week prior to procedure.  5.2.2023 PSA 4.54 in November 2022 prostate MRI: 1/25/23--PIRADS 2, 99c prostate PSAD 0.045 discussed family history of prostate cancer paternal uncle and father s/p biopsy x 2 10 years ago  REPEAT BIOPSY benign  patient states that he continues to xyosted  patient does not want to continue xyosted want to transition to aveed reviewed risks of AVEED with special emphasis on POME discussed  treatment protocol (initiation and maintenance) Informational materials given to patient.  6.20.2023  We had an extensive discussion re Risks of T replacement; Patient was informed about Black box warning from FDA. We discussed recent data regarding increased risk of coronary plaque size, heart disease; thrombolic event; increased Hbg/Hct, breast tenderness; acne; irritability; sleep apnea and increased urinary symptoms; effect on testicular function including suppression of spermatogenesis; hair loss (male pattern baldness) effect on LFTS; effect on prostate cancer. Different treatment approaches were discussed including IM, transdermal and Testopel We discussed advantages and disadvantages of each We discussed risk to T exposure to partners and children from transdermal appoaches. Patient choses to proceed with AVEED   9.28.2023 left flank pain ? musculoskeletal no radiation or change in urrinary symptoms findings suggestive of musculoskeletal origin consistent with prior diagnosis  patient anxious regarding symptoms  PLan: 1. urinalysis  2. abdominal ultrasound  3. NSAID/hot baths prn [facility]  expressed fully understanding of the information provided, the consequences and the management.  2/2/2024  Mr RANDALL returns.  He is complaining of similar urinary symptoms as in the past.  He states his urine is malodorous.  His urine frequency and nocturia have improved on alfuzosin.   We discussed his PSA.  We discussed his previous PSA values and his previous MRI and prostate biopsy.  Plan: 1.  PSA 4K 2.  We discussed the significance of a 4K assessment. 3.  Urinalysis urine culture 4.  Follow-up in 6 weeks for flow and PVR  3.21.2024 returns on daily tadalfil not sexually active LUTS 13 nocturia x 2 flow non diagnostic PVR 33 on uroxatral   will hold tadalfil since not sexually   PSA  5.0 4k low risk  Plan: continue AVEED Hbg/Hct 2/2024 normal stop daily tadalafil continue alfuzosin fu 3  weeks for AVEED   2.3.2025  DIscussed  treatment options including PD 5-I, Intracavernosal therapy, erection vacuum device. Instructional materials provided to patient. Risks and side effects of each discussed. We also had extensive discussion about penile prosthetic surgery. We discussed the risks and complications of penile prosthetic surgery including; 1. infection, 2. Mechanical failure, 3. Erosion, 4. Urethral injury,   Discussed dose escalation of tadalafil 20 mg Warned re priapism risk and need for immediate intervention if erection lasts more than 2 hours.  Patient instructed to report to an emergency room and explicitly inform staff of his condition and need for treatment.  Informational materials given to patient.  Plan: tadalafil 20 mg  4.8.2025 complaining of post void dribbling with increased nocturia no dysuria  no change in stream flow rate nondiagnostic continues on uroxatral   no change in bowel habits  MRI demomstrated prostate volume 98.1   Plan: urinalysis urine culture follow flow and PVR +/- cystoscopy  6/23/2025  Patient states that he had a Pneuma  procedure by Dr. Maravilla  (8.7.2024) .  This was complicated by infection requiring explanation and drainage of the scrotal abscess. (5/25)  He is concerned regarding the penile function at this time as well as the shortening  of his penis.  He feels that his penis is markedly shortened.  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  There is a packing in the right hemiscrotum.  This is being managed by visiting nurses.  He has not attempted tadalafil again.  He does note that this was effective prior to his surgical procedure.  He is interested in a second opinion regarding penile reconstruction/function.  I have asked him to see Dr. Mcclure's Jimloff from this regard.  I also suggest that he attempt to use tadalafil to assess function

## 2025-06-23 NOTE — ASSESSMENT
[FreeTextEntry1] : 11.8.2022 marketing for optimum single straight male patient previously seen by Dr Abel Peña maintained on Testopel  6 pellets x 4 year treatment was for documented low T low mental acuity and fatigue when on T improved   had attempted xyosted had attempted 3 injection and then developed ecchymosis   discussed  We had an extensive discussion re Risks of T replacement; Patient was informed about Black box warning from FDA. We discussed recent data regarding increased risk of coronary plaque size, heart disease; thrombolic event; increased Hbg/Hct, breast tenderness; acne; irritability; sleep apnea and increased urinary symptoms; effect on testicular function including suppression of spermatogenesis; hair loss (male pattern baldness) effect on LFTS; effect on prostate cancer. Different treatment approaches were discussed including IM, transdermal and Testopel and AVEED We discussed advantages and disadvantages of each  PSA 6.03 on finasteride x 5 years; one year ago reported as 3 discussed need for MRI + family history - father prostate cancer  we discussed biopsy in light of strong family history and PSA elevation even in the absence of MRI findings discussed MRI for identification of areas of concern We discussed the risks and complications of prostate biopsy and discussed the procedure. We discussed that procedure is performed by placing a rectal probe and administering anesthesia locally. Biopsies are then taken with a needle. Multiple biopsies are taken,  Risks of the procedure include hematuria, hematospermia, blood per rectum. Risks include infection, sepsis and even death. We discussed perineal vs transrectal biopsy We discussed decreased risk of perineal biopsy vs transrectal but associated greater discomfort  We discussed options of: 1. local, 2. IV sedation in Operating room 3. po Valium  Plan: 1. repeat PSA 2. MRI arranged by Dr Mendez 3. obtain outside labs verifying low T (patient will forward) 4. DELAY T treatment pending evaluation of PSA  2.22.23 returns to proceed w/ TRT PSA 4.54 in November 2022 prostate MRI: 1/25/23--PIRADS 2, 99c prostate PSAD 0.045 discussed family history of prostate cancer paternal uncle and father s/p biopsy x 2 10 years ago  long discussion regarding PSA and family risk recommend repeat  bopsy patient wants AVEED since believe effect of xyosted is less effective discussed mean of delivery  Plan: patient agrees to : testosterone PSA urine culture We discussed the risks and complications of prostate biopsy and discussed the procedure. We discussed that procedure is performed by placing a rectal probe and administering anesthesia locally. Biopsies are then taken with a needle. Multiple biopsies are taken,  Risks of the procedure include hematuria, hematospermia, blood per rectum. Risks include infection, sepsis and even death. We discussed perineal vs transrectal biopsy We discussed decreased risk of perineal biopsy vs transrectal but associated greater discomfort  We discussed options of: 1. local, 2. IV sedation in Operating room 3. po Valium Fleets enema should be taken. ASA and NSAID must be stopped 1 week prior to procedure.  5.2.2023 PSA 4.54 in November 2022 prostate MRI: 1/25/23--PIRADS 2, 99c prostate PSAD 0.045 discussed family history of prostate cancer paternal uncle and father s/p biopsy x 2 10 years ago  REPEAT BIOPSY benign  patient states that he continues to xyosted  patient does not want to continue xyosted want to transition to aveed reviewed risks of AVEED with special emphasis on POME discussed  treatment protocol (initiation and maintenance) Informational materials given to patient.  6.20.2023  We had an extensive discussion re Risks of T replacement; Patient was informed about Black box warning from FDA. We discussed recent data regarding increased risk of coronary plaque size, heart disease; thrombolic event; increased Hbg/Hct, breast tenderness; acne; irritability; sleep apnea and increased urinary symptoms; effect on testicular function including suppression of spermatogenesis; hair loss (male pattern baldness) effect on LFTS; effect on prostate cancer. Different treatment approaches were discussed including IM, transdermal and Testopel We discussed advantages and disadvantages of each We discussed risk to T exposure to partners and children from transdermal appoaches. Patient choses to proceed with AVEED   9.28.2023 left flank pain ? musculoskeletal no radiation or change in urrinary symptoms findings suggestive of musculoskeletal origin consistent with prior diagnosis  patient anxious regarding symptoms  PLan: 1. urinalysis  2. abdominal ultrasound  3. NSAID/hot baths prn [facility]  expressed fully understanding of the information provided, the consequences and the management.  2/2/2024  Mr RANDALL returns.  He is complaining of similar urinary symptoms as in the past.  He states his urine is malodorous.  His urine frequency and nocturia have improved on alfuzosin.   We discussed his PSA.  We discussed his previous PSA values and his previous MRI and prostate biopsy.  Plan: 1.  PSA 4K 2.  We discussed the significance of a 4K assessment. 3.  Urinalysis urine culture 4.  Follow-up in 6 weeks for flow and PVR  3.21.2024 returns on daily tadalfil not sexually active LUTS 13 nocturia x 2 flow non diagnostic PVR 33 on uroxatral   will hold tadalfil since not sexually   PSA  5.0 4k low risk  Plan: continue AVEED Hbg/Hct 2/2024 normal stop daily tadalafil continue alfuzosin fu 3  weeks for AVEED   2.3.2025  DIscussed  treatment options including PD 5-I, Intracavernosal therapy, erection vacuum device. Instructional materials provided to patient. Risks and side effects of each discussed. We also had extensive discussion about penile prosthetic surgery. We discussed the risks and complications of penile prosthetic surgery including; 1. infection, 2. Mechanical failure, 3. Erosion, 4. Urethral injury,   Discussed dose escalation of tadalafil 20 mg Warned re priapism risk and need for immediate intervention if erection lasts more than 2 hours.  Patient instructed to report to an emergency room and explicitly inform staff of his condition and need for treatment.  Informational materials given to patient.  Plan: tadalafil 20 mg  4.8.2025 complaining of post void dribbling with increased nocturia no dysuria  no change in stream flow rate nondiagnostic continues on uroxatral   no change in bowel habits  MRI demomstrated prostate volume 98.1   Plan: urinalysis urine culture follow flow and PVR +/- cystoscopy  6/23/2025  Patient states that he had a Pneuma  procedure by Dr. Maravilla  (8.7.2024) .  This was complicated by infection requiring explanation and drainage of the scrotal abscess. (5/25)  He is concerned regarding the penile function at this time as well as the shortening  of his penis.  He feels that his penis is markedly shortened.  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  There is a packing in the right hemiscrotum.  This is being managed by visiting nurses.  He has not attempted tadalafil again.  He does note that this was effective prior to his surgical procedure.  He is interested in a second opinion regarding penile reconstruction/function.  I have asked him to see Dr. Mcculre's Jimloff from this regard.  I also suggest that he attempt to use tadalafil to assess function

## 2025-07-20 NOTE — HISTORY OF PRESENT ILLNESS
[FreeTextEntry1] : Language: English Accompanied by: Self Contact info: [phone] Referring Urologist: Deandra   Initial H&P 07/11/2025: Mr. RANDALL is a very pleasant 63 year-old gentleman who presents today for initial evaluation of penile reconstruction consult.   He currently follows with Dr. Liriano.  Was following for several years for elevated PSA, low T, and LUTS.  On Aveed for his low T.  Also with ED, which was initially managed on tadalafil, however several months ago he inquired about IPP.  He was then more recently seen by Dr. Liriano after undergoing expland of his Penuma.  It was placed by Dr. Gtz on 8/7/2024.  It was ultimately complicated by infection requiring explant and drainage of the scrotal abscess. Started to feel discomfort 4/2025 and skin appeared irregular, and the device was removed 5/10/25. Was then taken back 5/28/25 for I&D.   He is concerned regarding the penile function at this time as well as the shortening of his penis.  He feels that his penis is markedly shortened.  On exam with Dr. 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  He has just finished his IV abx course, and wound is almost fully healed.  --------------------------------------------------------------------------------------------------------------------------------------- PMHx: Low T, ED, Coronary Spasms, BPH, HTN PSHx: Penuma, SVT Ablation, UHR (2021, no mesh), Sinus surgery SHx: denies x3, quit tob 13y ago,  for optimum   All: NKDA   14pt ROS neg other than HPI. --------------------------------------------------------------------------------------------------------------------------------------- Physical Exam: Chaperone: Bob Mitchell MA General: NAD, sitting on exam table comfortably HEENT: NCAT, EOMI Resp: breathing comfortably on RA, b/l symm chest rise Cardiac: RRR Abd: SNTND Back: (-) CVAT b/l MSK: BURTON w/ FROM Psych: appropriate affect : well-healed surgical site, significant scar/skin loss along right lateral base, penis slightly buried due to loss of shaft skin - causing skin at base to be pulled distally, (+) PS webbing  --------------------------------------------------------------------------------------------------------------------------------------- Results:   --------------------------------------------------------------------------------------------------------------------------------------- A/P: 63M with complications following penuma and ED.   1. Post-Penuma Complication/Buried Penis We had a long discussion today about his prior infection and resultant skin loss/scar formation, which is causing a slightly buried appearance.   He was recommended to use restorex to stretch the penis/scar tissue. I agree with this recommendation. Discussed that the resotrex device has been studied in peyronie's and post-prostatectomy patients, however in both groups, there was a statistically significant size gain from the stretching performed.  Additionally, given that restorex is able to stretch the hardened plaque that forms in the tunica (for peyronie's), one may expect similar results for scar tissue along the skin.   I recommended he perform daily stretching exercises for 30 mins BID, and we will reassess in 3 months.   2. ED Discussed that he will likely require implant, however, given his recent infection and surgery for removal of the penuma as well as I&D, I recommended against IPP placement for the next ~3 months time. I explained that there is still extensive scar tissue present that is in the active healing process, and the recent infection would place him at increased risk for infection following IPP placement.    We will dsicuss in further detail on f/u in 3 months.   I have answered all of his questions today, and I will see him in 3m or sooner PRN.  Plan: - restorex - cont tadalafil - RTC 3 months or sooner PRN  I spent a total of 37 minutes on face-to-face counseling, coordination of care, review of prior records and clinical documentation.

## 2025-07-20 NOTE — HISTORY OF PRESENT ILLNESS
[FreeTextEntry1] : Language: English Accompanied by: Self Contact info: [phone] Referring Urologist: Deandra   Initial H&P 07/11/2025: Mr. RANDALL is a very pleasant 63 year-old gentleman who presents today for initial evaluation of penile reconstruction consult.   He currently follows with Dr. Liriano.  Was following for several years for elevated PSA, low T, and LUTS.  On Aveed for his low T.  Also with ED, which was initially managed on tadalafil, however several months ago he inquired about IPP.  He was then more recently seen by Dr. Liriano after undergoing expland of his Penuma.  It was placed by Dr. Gtz on 8/7/2024.  It was ultimately complicated by infection requiring explant and drainage of the scrotal abscess. Started to feel discomfort 4/2025 and skin appeared irregular, and the device was removed 5/10/25. Was then taken back 5/28/25 for I&D.   He is concerned regarding the penile function at this time as well as the shortening of his penis.  He feels that his penis is markedly shortened.  On exam with Dr. 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  He has just finished his IV abx course, and wound is almost fully healed.  --------------------------------------------------------------------------------------------------------------------------------------- PMHx: Low T, ED, Coronary Spasms, BPH, HTN PSHx: Penuma, SVT Ablation, UHR (2021, no mesh), Sinus surgery SHx: denies x3, quit tob 13y ago,  for optimum   All: NKDA   14pt ROS neg other than HPI. --------------------------------------------------------------------------------------------------------------------------------------- Physical Exam: Chaperone: Bob Mitchell MA General: NAD, sitting on exam table comfortably HEENT: NCAT, EOMI Resp: breathing comfortably on RA, b/l symm chest rise Cardiac: RRR Abd: SNTND Back: (-) CVAT b/l MSK: BURTON w/ FROM Psych: appropriate affect : well-healed surgical site, significant scar/skin loss along right lateral base, penis slightly buried due to loss of shaft skin - causing skin at base to be pulled distally, (+) PS webbing  --------------------------------------------------------------------------------------------------------------------------------------- Results:   --------------------------------------------------------------------------------------------------------------------------------------- A/P: 63M with complications following penuma and ED.   1. Post-Penuma Complication/Buried Penis We had a long discussion today about his prior infection and resultant skin loss/scar formation, which is causing a slightly buried appearance.   He was recommended to use restorex to stretch the penis/scar tissue. I agree with this recommendation. Discussed that the resotrex device has been studied in peyronie's and post-prostatectomy patients, however in both groups, there was a statistically significant size gain from the stretching performed.  Additionally, given that restorex is able to stretch the hardened plaque that forms in the tunica (for peyronie's), one may expect similar results for scar tissue along the skin.   I recommended he perform daily stretching exercises for 30 mins BID, and we will reassess in 3 months.   2. ED Discussed that he will likely require implant, however, given his recent infection and surgery for removal of the penuma as well as I&D, I recommended against IPP placement for the next ~3 months time. I explained that there is still extensive scar tissue present that is in the active healing process, and the recent infection would place him at increased risk for infection following IPP placement.    We will dsicuss in further detail on f/u in 3 months.   I have answered all of his questions today, and I will see him in 3m or sooner PRN.  Plan: - restorex - cont tadalafil - RTC 3 months or sooner PRN  I spent a total of 37 minutes on face-to-face counseling, coordination of care, review of prior records and clinical documentation.